# Patient Record
Sex: FEMALE | Race: WHITE | NOT HISPANIC OR LATINO | URBAN - METROPOLITAN AREA
[De-identification: names, ages, dates, MRNs, and addresses within clinical notes are randomized per-mention and may not be internally consistent; named-entity substitution may affect disease eponyms.]

---

## 2017-01-09 ENCOUNTER — FOLLOW UP ESTABLISHED (OUTPATIENT)
Dept: URBAN - METROPOLITAN AREA CLINIC 64 | Facility: CLINIC | Age: 57
End: 2017-01-09
Payer: COMMERCIAL

## 2017-01-09 PROCEDURE — 92012 INTRM OPH EXAM EST PATIENT: CPT | Performed by: OPTOMETRIST

## 2017-01-09 ASSESSMENT — INTRAOCULAR PRESSURE
OD: 20
OS: 17

## 2017-02-27 ENCOUNTER — FOLLOW UP ESTABLISHED (OUTPATIENT)
Dept: URBAN - METROPOLITAN AREA CLINIC 10 | Facility: CLINIC | Age: 57
End: 2017-02-27
Payer: COMMERCIAL

## 2017-02-27 DIAGNOSIS — Z79.84 LONG TERM (CURRENT) USE OF ORAL ANTIDIABETIC DRUGS: ICD-10-CM

## 2017-02-27 DIAGNOSIS — H52.13 MYOPIA, BILATERAL: ICD-10-CM

## 2017-02-27 PROCEDURE — 92014 COMPRE OPH EXAM EST PT 1/>: CPT | Performed by: OPHTHALMOLOGY

## 2017-02-27 PROCEDURE — 92082 INTERMEDIATE VISUAL FIELD XM: CPT | Performed by: OPHTHALMOLOGY

## 2017-02-27 RX ORDER — FLUTICASONE PROPIONATE AND SALMETEROL XINAFOATE 115; 21 UG/1; UG/1
AEROSOL, METERED RESPIRATORY (INHALATION)
Qty: 0 | Refills: 0 | Status: ACTIVE
Start: 2017-02-27

## 2017-02-27 RX ORDER — RIZATRIPTAN BENZOATE 10 MG/1
10 MG TABLET ORAL
Qty: 0 | Refills: 0 | Status: INACTIVE
Start: 2017-02-27 | End: 2018-06-18

## 2017-02-27 ASSESSMENT — INTRAOCULAR PRESSURE
OS: 16
OD: 16

## 2017-05-30 ENCOUNTER — FOLLOW UP ESTABLISHED (OUTPATIENT)
Dept: URBAN - METROPOLITAN AREA CLINIC 64 | Facility: CLINIC | Age: 57
End: 2017-05-30
Payer: COMMERCIAL

## 2017-05-30 DIAGNOSIS — H10.45 OTHER CHRONIC ALLERGIC CONJUNCTIVITIS: ICD-10-CM

## 2017-05-30 DIAGNOSIS — H40.1132 PRIMARY OPEN-ANGLE GLAUCOMA, MODERATE STAGE, BILATERAL: Primary | ICD-10-CM

## 2017-05-30 PROCEDURE — 92012 INTRM OPH EXAM EST PATIENT: CPT | Performed by: OPTOMETRIST

## 2017-05-30 PROCEDURE — 92133 CPTRZD OPH DX IMG PST SGM ON: CPT | Performed by: OPTOMETRIST

## 2017-05-30 PROCEDURE — 92083 EXTENDED VISUAL FIELD XM: CPT | Performed by: OPTOMETRIST

## 2017-05-30 RX ORDER — CYCLOSPORINE 0.5 MG/ML
0.05 % EMULSION OPHTHALMIC
Qty: 60 | Refills: 3 | Status: INACTIVE
Start: 2017-05-30 | End: 2018-08-15

## 2017-05-30 RX ORDER — LATANOPROST 50 UG/ML
0.005 % SOLUTION OPHTHALMIC
Qty: 3 | Refills: 6 | Status: INACTIVE
Start: 2017-05-30 | End: 2018-07-30

## 2017-05-30 ASSESSMENT — INTRAOCULAR PRESSURE
OS: 10
OD: 11

## 2017-10-06 ENCOUNTER — FOLLOW UP ESTABLISHED (OUTPATIENT)
Dept: URBAN - METROPOLITAN AREA CLINIC 51 | Facility: CLINIC | Age: 57
End: 2017-10-06
Payer: COMMERCIAL

## 2017-10-06 DIAGNOSIS — H04.123 DRY EYE SYNDROME OF BILATERAL LACRIMAL GLANDS: ICD-10-CM

## 2017-10-06 PROCEDURE — 92133 CPTRZD OPH DX IMG PST SGM ON: CPT | Performed by: OPHTHALMOLOGY

## 2017-10-06 PROCEDURE — 92012 INTRM OPH EXAM EST PATIENT: CPT | Performed by: OPHTHALMOLOGY

## 2017-10-06 ASSESSMENT — INTRAOCULAR PRESSURE
OS: 15
OD: 15

## 2017-12-18 ENCOUNTER — FOLLOW UP ESTABLISHED (OUTPATIENT)
Dept: URBAN - METROPOLITAN AREA CLINIC 64 | Facility: CLINIC | Age: 57
End: 2017-12-18
Payer: COMMERCIAL

## 2017-12-18 PROCEDURE — 92014 COMPRE OPH EXAM EST PT 1/>: CPT | Performed by: OPTOMETRIST

## 2017-12-18 PROCEDURE — 92083 EXTENDED VISUAL FIELD XM: CPT | Performed by: OPTOMETRIST

## 2017-12-18 PROCEDURE — 92133 CPTRZD OPH DX IMG PST SGM ON: CPT | Performed by: OPTOMETRIST

## 2017-12-18 ASSESSMENT — INTRAOCULAR PRESSURE
OD: 19
OS: 18

## 2018-03-21 ENCOUNTER — FOLLOW UP ESTABLISHED (OUTPATIENT)
Dept: URBAN - METROPOLITAN AREA CLINIC 64 | Facility: CLINIC | Age: 58
End: 2018-03-21
Payer: COMMERCIAL

## 2018-03-21 PROCEDURE — 92020 GONIOSCOPY: CPT | Performed by: OPTOMETRIST

## 2018-03-21 PROCEDURE — 92012 INTRM OPH EXAM EST PATIENT: CPT | Performed by: OPTOMETRIST

## 2018-03-21 PROCEDURE — 76514 ECHO EXAM OF EYE THICKNESS: CPT | Performed by: OPTOMETRIST

## 2018-03-21 ASSESSMENT — INTRAOCULAR PRESSURE
OS: 15
OD: 10

## 2018-06-18 ENCOUNTER — FOLLOW UP ESTABLISHED (OUTPATIENT)
Dept: URBAN - METROPOLITAN AREA CLINIC 64 | Facility: CLINIC | Age: 58
End: 2018-06-18
Payer: COMMERCIAL

## 2018-06-18 PROCEDURE — 92012 INTRM OPH EXAM EST PATIENT: CPT | Performed by: OPTOMETRIST

## 2018-06-18 ASSESSMENT — INTRAOCULAR PRESSURE
OS: 13
OD: 12

## 2018-10-02 ENCOUNTER — FOLLOW UP ESTABLISHED (OUTPATIENT)
Dept: URBAN - METROPOLITAN AREA CLINIC 64 | Facility: CLINIC | Age: 58
End: 2018-10-02
Payer: COMMERCIAL

## 2018-10-02 PROCEDURE — 92015 DETERMINE REFRACTIVE STATE: CPT | Performed by: OPTOMETRIST

## 2018-10-02 PROCEDURE — 92014 COMPRE OPH EXAM EST PT 1/>: CPT | Performed by: OPTOMETRIST

## 2018-10-02 ASSESSMENT — VISUAL ACUITY
OS: 20/20
OD: 20/20

## 2018-10-02 ASSESSMENT — INTRAOCULAR PRESSURE
OD: 17
OS: 15

## 2018-10-02 ASSESSMENT — KERATOMETRY
OD: 44.88
OS: 44.97

## 2019-03-05 ENCOUNTER — FOLLOW UP ESTABLISHED (OUTPATIENT)
Dept: URBAN - METROPOLITAN AREA CLINIC 64 | Facility: CLINIC | Age: 59
End: 2019-03-05
Payer: COMMERCIAL

## 2019-03-05 PROCEDURE — 92133 CPTRZD OPH DX IMG PST SGM ON: CPT | Performed by: OPTOMETRIST

## 2019-03-05 PROCEDURE — 92083 EXTENDED VISUAL FIELD XM: CPT | Performed by: OPTOMETRIST

## 2019-03-05 PROCEDURE — 92012 INTRM OPH EXAM EST PATIENT: CPT | Performed by: OPTOMETRIST

## 2019-03-05 ASSESSMENT — INTRAOCULAR PRESSURE
OS: 19
OD: 18

## 2019-06-11 ENCOUNTER — FOLLOW UP ESTABLISHED (OUTPATIENT)
Dept: URBAN - METROPOLITAN AREA CLINIC 64 | Facility: CLINIC | Age: 59
End: 2019-06-11
Payer: COMMERCIAL

## 2019-06-11 PROCEDURE — 92012 INTRM OPH EXAM EST PATIENT: CPT | Performed by: OPTOMETRIST

## 2019-09-11 ENCOUNTER — FOLLOW UP ESTABLISHED (OUTPATIENT)
Dept: URBAN - METROPOLITAN AREA CLINIC 64 | Facility: CLINIC | Age: 59
End: 2019-09-11
Payer: COMMERCIAL

## 2019-09-11 PROCEDURE — 92012 INTRM OPH EXAM EST PATIENT: CPT | Performed by: OPTOMETRIST

## 2019-12-11 ENCOUNTER — FOLLOW UP ESTABLISHED (OUTPATIENT)
Dept: URBAN - METROPOLITAN AREA CLINIC 64 | Facility: CLINIC | Age: 59
End: 2019-12-11
Payer: COMMERCIAL

## 2019-12-11 PROCEDURE — 92012 INTRM OPH EXAM EST PATIENT: CPT | Performed by: OPTOMETRIST

## 2020-03-10 ENCOUNTER — FOLLOW UP ESTABLISHED (OUTPATIENT)
Dept: URBAN - METROPOLITAN AREA CLINIC 64 | Facility: CLINIC | Age: 60
End: 2020-03-10
Payer: COMMERCIAL

## 2020-03-10 PROCEDURE — 92083 EXTENDED VISUAL FIELD XM: CPT | Performed by: OPTOMETRIST

## 2020-03-10 PROCEDURE — 92012 INTRM OPH EXAM EST PATIENT: CPT | Performed by: OPTOMETRIST

## 2020-03-10 PROCEDURE — 92133 CPTRZD OPH DX IMG PST SGM ON: CPT | Performed by: OPTOMETRIST

## 2020-06-10 ENCOUNTER — FOLLOW UP ESTABLISHED (OUTPATIENT)
Dept: URBAN - METROPOLITAN AREA CLINIC 64 | Facility: CLINIC | Age: 60
End: 2020-06-10
Payer: COMMERCIAL

## 2020-06-10 DIAGNOSIS — H40.1134 PRIMARY OPEN-ANGLE GLAUCOMA, BILATERAL, INDETERMINATE STAGE: Primary | ICD-10-CM

## 2020-06-10 PROCEDURE — 92012 INTRM OPH EXAM EST PATIENT: CPT | Performed by: OPTOMETRIST

## 2020-09-10 ENCOUNTER — FOLLOW UP ESTABLISHED (OUTPATIENT)
Dept: URBAN - METROPOLITAN AREA CLINIC 64 | Facility: CLINIC | Age: 60
End: 2020-09-10
Payer: COMMERCIAL

## 2020-09-10 PROCEDURE — 92012 INTRM OPH EXAM EST PATIENT: CPT | Performed by: OPTOMETRIST

## 2020-12-14 ENCOUNTER — FOLLOW UP ESTABLISHED (OUTPATIENT)
Dept: URBAN - METROPOLITAN AREA CLINIC 64 | Facility: CLINIC | Age: 60
End: 2020-12-14
Payer: COMMERCIAL

## 2020-12-14 PROCEDURE — 92012 INTRM OPH EXAM EST PATIENT: CPT | Performed by: OPTOMETRIST

## 2020-12-14 ASSESSMENT — INTRAOCULAR PRESSURE
OS: 18
OD: 23

## 2021-03-23 ENCOUNTER — FOLLOW UP ESTABLISHED (OUTPATIENT)
Dept: URBAN - METROPOLITAN AREA CLINIC 64 | Facility: CLINIC | Age: 61
End: 2021-03-23
Payer: COMMERCIAL

## 2021-03-23 PROCEDURE — 92083 EXTENDED VISUAL FIELD XM: CPT | Performed by: OPTOMETRIST

## 2021-03-23 PROCEDURE — 92133 CPTRZD OPH DX IMG PST SGM ON: CPT | Performed by: OPTOMETRIST

## 2021-03-23 PROCEDURE — 99213 OFFICE O/P EST LOW 20 MIN: CPT | Performed by: OPTOMETRIST

## 2021-03-23 ASSESSMENT — INTRAOCULAR PRESSURE
OS: 19
OD: 18

## 2021-06-23 ENCOUNTER — OFFICE VISIT (OUTPATIENT)
Dept: URBAN - METROPOLITAN AREA CLINIC 64 | Facility: CLINIC | Age: 61
End: 2021-06-23
Payer: COMMERCIAL

## 2021-06-23 DIAGNOSIS — H40.013 OPEN ANGLE WITH BORDERLINE FINDINGS, LOW RISK, BILATERAL: Primary | ICD-10-CM

## 2021-06-23 PROCEDURE — 92012 INTRM OPH EXAM EST PATIENT: CPT | Performed by: OPTOMETRIST

## 2021-06-23 RX ORDER — OSPEMIFENE 60 MG/1
60 MG TABLET, FILM COATED ORAL
Qty: 0 | Refills: 0 | Status: INACTIVE
Start: 2021-06-23 | End: 2021-09-29

## 2021-06-23 ASSESSMENT — INTRAOCULAR PRESSURE
OS: 14
OD: 14

## 2021-06-23 NOTE — IMPRESSION/PLAN
Impression: Open angle with borderline findings, low risk, bilateral: H40.013. Plan: Family Hx/ Physiological Cupping. 14 OU IOP today. RTC 3-4 months for IOP check.

## 2021-09-29 ENCOUNTER — OFFICE VISIT (OUTPATIENT)
Dept: URBAN - METROPOLITAN AREA CLINIC 64 | Facility: CLINIC | Age: 61
End: 2021-09-29
Payer: COMMERCIAL

## 2021-09-29 PROCEDURE — 99213 OFFICE O/P EST LOW 20 MIN: CPT | Performed by: OPTOMETRIST

## 2021-09-29 ASSESSMENT — INTRAOCULAR PRESSURE
OS: 18
OD: 20

## 2021-09-29 NOTE — IMPRESSION/PLAN
Impression: Type 2 diabetes mellitus without complications: N02.9. Plan: No Diabetic Retinopathy, no Diabetic Macular Edema and no Neovascularization of the iris, disc, or elsewhere. Discussed ocular and systemic benefits of blood sugar control. Check annually. Last A1C 5. 1.

## 2021-09-29 NOTE — IMPRESSION/PLAN
Impression: Primary open-angle glaucoma, bilateral, indeterminate stage +Family Hx of Glaucoma(sister (PXE),brother)/ +Sulfa Allergy/denies heart problems/+Sleep Apnea(C-Pap)/Hx of Migraines; ASTHMA
KPE/IOL OU 2004 Plan: IOP 20/18  on Latanoprost QHS. PX reports compliance. Cont. Discussed diagnosis in detail with patient. Poor compliance can lead to blindness. Testing up to date. FU 3/4 months IOP check.

## 2022-01-31 ENCOUNTER — OFFICE VISIT (OUTPATIENT)
Dept: URBAN - METROPOLITAN AREA CLINIC 64 | Facility: CLINIC | Age: 62
End: 2022-01-31
Payer: COMMERCIAL

## 2022-01-31 DIAGNOSIS — E11.9 TYPE 2 DIABETES MELLITUS WITHOUT COMPLICATIONS: Primary | ICD-10-CM

## 2022-01-31 DIAGNOSIS — Z96.1 PRESENCE OF INTRAOCULAR LENS: ICD-10-CM

## 2022-01-31 PROCEDURE — 99214 OFFICE O/P EST MOD 30 MIN: CPT | Performed by: OPTOMETRIST

## 2022-01-31 ASSESSMENT — INTRAOCULAR PRESSURE
OS: 15
OD: 15

## 2022-01-31 ASSESSMENT — VISUAL ACUITY
OD: 20/20
OS: 20/20

## 2022-04-26 ENCOUNTER — TESTING ONLY (OUTPATIENT)
Dept: URBAN - METROPOLITAN AREA CLINIC 64 | Facility: CLINIC | Age: 62
End: 2022-04-26
Payer: COMMERCIAL

## 2022-04-26 DIAGNOSIS — H40.1134 PRIMARY OPEN-ANGLE GLAUCOMA, BILATERAL, INDETERMINATE STAGE: Primary | ICD-10-CM

## 2022-04-26 PROCEDURE — 99213 OFFICE O/P EST LOW 20 MIN: CPT | Performed by: OPTOMETRIST

## 2022-04-26 PROCEDURE — 92083 EXTENDED VISUAL FIELD XM: CPT | Performed by: OPTOMETRIST

## 2022-04-26 PROCEDURE — 92133 CPTRZD OPH DX IMG PST SGM ON: CPT | Performed by: OPTOMETRIST

## 2022-04-26 NOTE — IMPRESSION/PLAN
Impression: Primary open-angle glaucoma, bilateral, indeterminate stage +Family Hx of Glaucoma(sister (PXE),brother)/ +Sulfa Allergy/denies heart problems/+Sleep Apnea(C-Pap)/Hx of Migraines; ASTHMA
KPE/IOL OU 2004 Plan: IOP good  on Latanoprost QHS. PX reports compliance. Cont. Discussed diagnosis in detail with patient. Poor compliance can lead to blindness. Testing up to date. HVF/RNFL full/normal. 
RTC 3 months for IOP check.

## 2022-08-23 ENCOUNTER — OFFICE VISIT (OUTPATIENT)
Dept: URBAN - METROPOLITAN AREA CLINIC 64 | Facility: CLINIC | Age: 62
End: 2022-08-23
Payer: COMMERCIAL

## 2022-08-23 DIAGNOSIS — H40.1134 PRIMARY OPEN-ANGLE GLAUCOMA, BILATERAL, INDETERMINATE STAGE: Primary | ICD-10-CM

## 2022-08-23 PROCEDURE — 99213 OFFICE O/P EST LOW 20 MIN: CPT | Performed by: OPTOMETRIST

## 2022-08-23 ASSESSMENT — INTRAOCULAR PRESSURE
OD: 25
OS: 27

## 2022-08-23 NOTE — IMPRESSION/PLAN
Impression: Primary open-angle glaucoma, bilateral, indeterminate stage: H40.1134. Plan: IOP OD: 27 OS: 25   Tmax. Target IOP Last HVF/RNFL OCT done -FHx, -Asthma, -Sulfa Allergy,No beta blockers Discussed risks of progression. Poor compliance can lead to blindness. Continue Latanoprost QHS OU drops RTC 1 month IOP check.

## 2022-09-14 ENCOUNTER — OFFICE VISIT (OUTPATIENT)
Dept: URBAN - METROPOLITAN AREA CLINIC 64 | Facility: CLINIC | Age: 62
End: 2022-09-14
Payer: COMMERCIAL

## 2022-09-14 DIAGNOSIS — H40.1134 PRIMARY OPEN-ANGLE GLAUCOMA, BILATERAL, INDETERMINATE STAGE: Primary | ICD-10-CM

## 2022-09-14 PROCEDURE — 99214 OFFICE O/P EST MOD 30 MIN: CPT | Performed by: OPTOMETRIST

## 2022-09-14 PROCEDURE — 76514 ECHO EXAM OF EYE THICKNESS: CPT | Performed by: OPTOMETRIST

## 2022-09-14 RX ORDER — NETARSUDIL AND LATANOPROST OPHTHALMIC SOLUTION, 0.02%/0.005% .2; .05 MG/ML; MG/ML
SOLUTION/ DROPS OPHTHALMIC; TOPICAL
Qty: 1 | Refills: 1 | Status: ACTIVE
Start: 2022-09-14

## 2022-09-14 ASSESSMENT — INTRAOCULAR PRESSURE
OS: 25
OD: 24

## 2022-09-14 NOTE — IMPRESSION/PLAN
Impression: Primary open-angle glaucoma, bilateral, indeterminate stage: H40.1134. Plan: IOP OD: 24 OS: 25   Tmax. Target IOP Last HVF/RNFL OCT done 04/26/2022
-FHx, +Asthma, -Sulfa Allergy,No beta blockers Discussed risks of progression. Poor compliance can lead to blindness.

-Discontinue Latanoprost
-Switch to rocklatan OU QHS, samples given today RTC 1 month IOP check and RNFL

## 2022-10-17 ENCOUNTER — OFFICE VISIT (OUTPATIENT)
Dept: URBAN - METROPOLITAN AREA CLINIC 64 | Facility: CLINIC | Age: 62
End: 2022-10-17
Payer: COMMERCIAL

## 2022-10-17 DIAGNOSIS — H40.1134 PRIMARY OPEN-ANGLE GLAUCOMA, BILATERAL, INDETERMINATE STAGE: Primary | ICD-10-CM

## 2022-10-17 PROCEDURE — 99213 OFFICE O/P EST LOW 20 MIN: CPT | Performed by: OPTOMETRIST

## 2022-10-17 PROCEDURE — 92133 CPTRZD OPH DX IMG PST SGM ON: CPT | Performed by: OPTOMETRIST

## 2022-10-17 ASSESSMENT — INTRAOCULAR PRESSURE
OD: 17
OS: 16

## 2022-10-17 NOTE — IMPRESSION/PLAN
Impression: Primary open-angle glaucoma, bilateral, indeterminate stage: H40.1134. Plan: IOP OD: 17 OS: 16 Last HVF done 04/26/2022, RNFL 10/17/2022
-FHx, +Asthma, -Sulfa Allergy,No beta blockers Discussed risks of progression. Poor compliance can lead to blindness. 

Continue rocklatan OU QHS

RTC 4 months for IOP and Optos

## 2023-01-25 ENCOUNTER — OFFICE VISIT (OUTPATIENT)
Dept: URBAN - METROPOLITAN AREA CLINIC 64 | Facility: CLINIC | Age: 63
End: 2023-01-25
Payer: COMMERCIAL

## 2023-01-25 DIAGNOSIS — H40.1134 PRIMARY OPEN-ANGLE GLAUCOMA, BILATERAL, INDETERMINATE STAGE: Primary | ICD-10-CM

## 2023-01-25 PROCEDURE — 99213 OFFICE O/P EST LOW 20 MIN: CPT | Performed by: OPTOMETRIST

## 2023-01-25 ASSESSMENT — INTRAOCULAR PRESSURE
OS: 18
OD: 18

## 2023-01-25 NOTE — IMPRESSION/PLAN
Impression: Primary open-angle glaucoma, bilateral, indeterminate stage: H40.1134. Plan: IOP OD: 18 OS: 18 Last HVF done 04/26/2022, RNFL 10/17/2022
-FHx, +Asthma, -Sulfa Allergy,No beta blockers Discussed risks of progression. Poor compliance can lead to blindness. Continue rocklatan OU QHS

RTC 4 months for IOP and HVF 24-2, RNFL OCT.

## 2023-05-30 ENCOUNTER — OFFICE VISIT (OUTPATIENT)
Dept: URBAN - METROPOLITAN AREA CLINIC 64 | Facility: CLINIC | Age: 63
End: 2023-05-30
Payer: COMMERCIAL

## 2023-05-30 DIAGNOSIS — Z96.1 PRESENCE OF INTRAOCULAR LENS: Primary | ICD-10-CM

## 2023-05-30 DIAGNOSIS — H40.1134 PRIMARY OPEN-ANGLE GLAUCOMA, BILATERAL, INDETERMINATE STAGE: ICD-10-CM

## 2023-05-30 PROCEDURE — 99213 OFFICE O/P EST LOW 20 MIN: CPT | Performed by: OPTOMETRIST

## 2023-05-30 ASSESSMENT — INTRAOCULAR PRESSURE
OS: 18
OD: 18

## 2023-05-30 NOTE — IMPRESSION/PLAN
Impression: Primary open-angle glaucoma, bilateral, indeterminate stage: H40.1134. Plan: IOP OD: 18 OS: 18 Last HVF done 04/26/2022, RNFL 10/17/2022
-FHx, +Asthma, -Sulfa Allergy,No beta blockers Discussed risks of progression. Poor compliance can lead to blindness. Continue rocklatan OU QHS Pt has a fam hx of sister & brother diagnosed with android streaks. RTC for retina consult in 1 month Then RTC 4 months for VF 24-2, OCT-RNFL OU and an IOP check.

## 2023-06-01 ENCOUNTER — OFFICE VISIT (OUTPATIENT)
Dept: URBAN - METROPOLITAN AREA CLINIC 64 | Facility: CLINIC | Age: 63
End: 2023-06-01
Payer: COMMERCIAL

## 2023-06-01 DIAGNOSIS — E11.9 TYPE 2 DIABETES MELLITUS WITHOUT COMPLICATIONS: ICD-10-CM

## 2023-06-01 DIAGNOSIS — H52.13 MYOPIA, BILATERAL: Primary | ICD-10-CM

## 2023-06-01 PROCEDURE — 92014 COMPRE OPH EXAM EST PT 1/>: CPT | Performed by: OPTOMETRIST

## 2023-06-01 ASSESSMENT — VISUAL ACUITY
OS: 20/20
OD: 20/20

## 2023-06-01 ASSESSMENT — KERATOMETRY
OD: 44.53
OS: 44.71

## 2023-06-01 NOTE — IMPRESSION/PLAN
Impression: Type 2 diabetes mellitus without complications: R61.2. Plan: No Diabetic Retinopathy, no Diabetic Macular Edema and no Neovascularization of the iris, disc, or elsewhere. Discussed ocular and systemic benefits of blood sugar control. Check annually. Last A1C 6.8.

## 2023-07-03 ENCOUNTER — OFFICE VISIT (OUTPATIENT)
Dept: URBAN - METROPOLITAN AREA CLINIC 64 | Facility: LOCATION | Age: 63
End: 2023-07-03
Payer: COMMERCIAL

## 2023-07-03 DIAGNOSIS — H52.10 MYOPIA, UNSPECIFIED EYE: Primary | ICD-10-CM

## 2023-07-03 PROCEDURE — 99204 OFFICE O/P NEW MOD 45 MIN: CPT | Performed by: OPHTHALMOLOGY

## 2023-07-03 PROCEDURE — 92250 FUNDUS PHOTOGRAPHY W/I&R: CPT | Performed by: OPHTHALMOLOGY

## 2023-07-03 PROCEDURE — 92134 CPTRZ OPH DX IMG PST SGM RTA: CPT | Performed by: OPHTHALMOLOGY

## 2023-07-03 ASSESSMENT — INTRAOCULAR PRESSURE
OS: 16
OD: 17

## 2023-07-03 NOTE — IMPRESSION/PLAN
Impression: Myopia, unspecified eye: H52.10. Plan: No history of lacquer cracks or ANGIOID STREAKS (family history of the latter) -- explained that [id number] typically don't develop later on in life; continue routine evaluation w/ Dr Jose Alejandro Garcia. PRN with me.

## 2023-09-26 ENCOUNTER — OFFICE VISIT (OUTPATIENT)
Dept: URBAN - METROPOLITAN AREA CLINIC 64 | Facility: LOCATION | Age: 63
End: 2023-09-26
Payer: COMMERCIAL

## 2023-09-26 DIAGNOSIS — H40.1134 PRIMARY OPEN-ANGLE GLAUCOMA, BILATERAL, INDETERMINATE STAGE: Primary | ICD-10-CM

## 2023-09-26 PROCEDURE — 92133 CPTRZD OPH DX IMG PST SGM ON: CPT | Performed by: OPTOMETRIST

## 2023-09-26 PROCEDURE — 99213 OFFICE O/P EST LOW 20 MIN: CPT | Performed by: OPTOMETRIST

## 2023-09-26 PROCEDURE — 92083 EXTENDED VISUAL FIELD XM: CPT | Performed by: OPTOMETRIST

## 2023-09-26 ASSESSMENT — INTRAOCULAR PRESSURE
OS: 17
OD: 17

## 2023-09-26 ASSESSMENT — KERATOMETRY
OS: 44.84
OD: 44.77

## 2024-01-08 ENCOUNTER — OFFICE VISIT (OUTPATIENT)
Dept: URBAN - METROPOLITAN AREA CLINIC 64 | Facility: LOCATION | Age: 64
End: 2024-01-08
Payer: COMMERCIAL

## 2024-01-08 DIAGNOSIS — H40.1134 PRIMARY OPEN-ANGLE GLAUCOMA, BILATERAL, INDETERMINATE STAGE: Primary | ICD-10-CM

## 2024-01-08 PROCEDURE — 99213 OFFICE O/P EST LOW 20 MIN: CPT | Performed by: OPTOMETRIST

## 2024-01-08 RX ORDER — CYCLOSPORINE 0.5 MG/ML
0.05 % EMULSION OPHTHALMIC
Qty: 60 | Refills: 9 | Status: ACTIVE
Start: 2024-01-08

## 2024-01-08 ASSESSMENT — INTRAOCULAR PRESSURE
OS: 20
OD: 19

## 2024-05-08 ENCOUNTER — OFFICE VISIT (OUTPATIENT)
Dept: URBAN - METROPOLITAN AREA CLINIC 64 | Facility: LOCATION | Age: 64
End: 2024-05-08
Payer: COMMERCIAL

## 2024-05-08 DIAGNOSIS — H40.1134 PRIMARY OPEN-ANGLE GLAUCOMA, BILATERAL, INDETERMINATE STAGE: Primary | ICD-10-CM

## 2024-05-08 PROCEDURE — 99213 OFFICE O/P EST LOW 20 MIN: CPT | Performed by: OPTOMETRIST

## 2024-05-08 ASSESSMENT — INTRAOCULAR PRESSURE
OS: 21
OD: 21

## 2024-06-04 ENCOUNTER — OFFICE VISIT (OUTPATIENT)
Dept: URBAN - METROPOLITAN AREA CLINIC 64 | Facility: LOCATION | Age: 64
End: 2024-06-04
Payer: COMMERCIAL

## 2024-06-04 DIAGNOSIS — H52.13 MYOPIA, BILATERAL: Primary | ICD-10-CM

## 2024-06-04 DIAGNOSIS — H40.1134 PRIMARY OPEN-ANGLE GLAUCOMA, BILATERAL, INDETERMINATE STAGE: ICD-10-CM

## 2024-06-04 DIAGNOSIS — E11.9 TYPE 2 DIABETES MELLITUS WITHOUT COMPLICATIONS: ICD-10-CM

## 2024-06-04 PROCEDURE — 92014 COMPRE OPH EXAM EST PT 1/>: CPT | Performed by: OPTOMETRIST

## 2024-06-04 ASSESSMENT — KERATOMETRY
OS: 44.59
OD: 44.90

## 2024-06-04 ASSESSMENT — INTRAOCULAR PRESSURE
OD: 24
OD: 19
OS: 19

## 2024-06-04 ASSESSMENT — VISUAL ACUITY
OS: 20/20
OD: 20/20

## 2024-10-22 ENCOUNTER — OFFICE VISIT (OUTPATIENT)
Dept: URBAN - METROPOLITAN AREA CLINIC 64 | Facility: LOCATION | Age: 64
End: 2024-10-22
Payer: COMMERCIAL

## 2024-10-22 DIAGNOSIS — H40.1134 PRIMARY OPEN-ANGLE GLAUCOMA, BILATERAL, INDETERMINATE STAGE: Primary | ICD-10-CM

## 2024-10-22 DIAGNOSIS — H16.223 KERATOCONJUNCTIVITIS SICCA, NOT SPECIFIED AS SJ”GREN'S, BILATERAL: ICD-10-CM

## 2024-10-22 PROCEDURE — 99214 OFFICE O/P EST MOD 30 MIN: CPT | Performed by: OPTOMETRIST

## 2024-10-22 PROCEDURE — 92083 EXTENDED VISUAL FIELD XM: CPT | Performed by: OPTOMETRIST

## 2024-10-22 PROCEDURE — 92133 CPTRZD OPH DX IMG PST SGM ON: CPT | Performed by: OPTOMETRIST

## 2024-10-22 RX ORDER — NETARSUDIL AND LATANOPROST OPHTHALMIC SOLUTION, 0.02%/0.005% .2; .05 MG/ML; MG/ML
SOLUTION/ DROPS OPHTHALMIC; TOPICAL
Qty: 3 | Refills: 3 | Status: ACTIVE
Start: 2024-10-22

## 2024-10-22 RX ORDER — CYCLOSPORINE 0.5 MG/ML
0.05 % EMULSION OPHTHALMIC
Qty: 60 | Refills: 9 | Status: INACTIVE
Start: 2024-10-22 | End: 2024-10-25

## 2024-10-22 ASSESSMENT — INTRAOCULAR PRESSURE
OS: 19
OD: 17

## 2024-12-17 ENCOUNTER — OFFICE VISIT (OUTPATIENT)
Dept: URBAN - METROPOLITAN AREA CLINIC 64 | Facility: LOCATION | Age: 64
End: 2024-12-17
Payer: COMMERCIAL

## 2024-12-17 DIAGNOSIS — H11.31 SUBCONJUNCTIVAL HEMORRHAGE OF RIGHT EYE: Primary | ICD-10-CM

## 2024-12-17 PROCEDURE — 99213 OFFICE O/P EST LOW 20 MIN: CPT | Performed by: OPTOMETRIST

## 2025-02-20 ENCOUNTER — OFFICE VISIT (OUTPATIENT)
Dept: URBAN - METROPOLITAN AREA CLINIC 64 | Facility: LOCATION | Age: 65
End: 2025-02-20
Payer: COMMERCIAL

## 2025-02-20 DIAGNOSIS — H00.14 CHALAZION LEFT UPPER LID: ICD-10-CM

## 2025-02-20 DIAGNOSIS — H40.1134 PRIMARY OPEN-ANGLE GLAUCOMA, BILATERAL, INDETERMINATE STAGE: Primary | ICD-10-CM

## 2025-02-20 PROCEDURE — 99213 OFFICE O/P EST LOW 20 MIN: CPT | Performed by: OPTOMETRIST

## 2025-02-20 PROCEDURE — 92134 CPTRZ OPH DX IMG PST SGM RTA: CPT | Performed by: OPTOMETRIST

## 2025-02-20 ASSESSMENT — INTRAOCULAR PRESSURE
OD: 25
OS: 22

## 2025-03-24 ENCOUNTER — OFFICE VISIT (OUTPATIENT)
Dept: URBAN - METROPOLITAN AREA CLINIC 64 | Facility: LOCATION | Age: 65
End: 2025-03-24
Payer: COMMERCIAL

## 2025-03-24 DIAGNOSIS — H40.1134 PRIMARY OPEN-ANGLE GLAUCOMA, BILATERAL, INDETERMINATE STAGE: Primary | ICD-10-CM

## 2025-03-24 PROCEDURE — 99213 OFFICE O/P EST LOW 20 MIN: CPT | Performed by: OPTOMETRIST

## 2025-03-24 ASSESSMENT — INTRAOCULAR PRESSURE
OS: 20
OD: 19

## 2025-06-09 ENCOUNTER — OFFICE VISIT (OUTPATIENT)
Dept: URBAN - METROPOLITAN AREA CLINIC 64 | Facility: LOCATION | Age: 65
End: 2025-06-09
Payer: COMMERCIAL

## 2025-06-09 DIAGNOSIS — H43.393 OTHER VITREOUS OPACITIES, BILATERAL: ICD-10-CM

## 2025-06-09 DIAGNOSIS — H40.1134 PRIMARY OPEN-ANGLE GLAUCOMA, BILATERAL, INDETERMINATE STAGE: ICD-10-CM

## 2025-06-09 DIAGNOSIS — Z96.1 PRESENCE OF INTRAOCULAR LENS: ICD-10-CM

## 2025-06-09 DIAGNOSIS — E11.9 TYPE 2 DIABETES MELLITUS WITHOUT COMPLICATIONS: ICD-10-CM

## 2025-06-09 DIAGNOSIS — H16.223 KERATOCONJUNCTIVITIS SICCA, NOT SPECIFIED AS SJ”GREN'S, BILATERAL: ICD-10-CM

## 2025-06-09 DIAGNOSIS — H52.03 HYPERMETROPIA, BILATERAL: Primary | ICD-10-CM

## 2025-06-09 PROCEDURE — 92014 COMPRE OPH EXAM EST PT 1/>: CPT | Performed by: OPTOMETRIST

## 2025-06-09 ASSESSMENT — INTRAOCULAR PRESSURE
OS: 21
OD: 21
OD: 23
OS: 23

## 2025-06-09 ASSESSMENT — VISUAL ACUITY
OD: 20/20
OS: 20/20

## 2025-06-24 ENCOUNTER — OFFICE VISIT (OUTPATIENT)
Dept: URBAN - METROPOLITAN AREA CLINIC 64 | Facility: LOCATION | Age: 65
End: 2025-06-24
Payer: COMMERCIAL

## 2025-06-24 DIAGNOSIS — H52.10 MYOPIA, UNSPECIFIED EYE: ICD-10-CM

## 2025-06-24 DIAGNOSIS — H43.393 OTHER VITREOUS OPACITIES, BILATERAL: Primary | ICD-10-CM

## 2025-06-24 PROCEDURE — 92134 CPTRZ OPH DX IMG PST SGM RTA: CPT | Performed by: OPHTHALMOLOGY

## 2025-06-24 PROCEDURE — 99214 OFFICE O/P EST MOD 30 MIN: CPT | Performed by: OPHTHALMOLOGY

## 2025-06-24 ASSESSMENT — INTRAOCULAR PRESSURE
OD: 25
OS: 21